# Patient Record
Sex: MALE | Race: WHITE | NOT HISPANIC OR LATINO | Employment: FULL TIME | URBAN - METROPOLITAN AREA
[De-identification: names, ages, dates, MRNs, and addresses within clinical notes are randomized per-mention and may not be internally consistent; named-entity substitution may affect disease eponyms.]

---

## 2024-08-19 ENCOUNTER — HOSPITAL ENCOUNTER (EMERGENCY)
Facility: HOSPITAL | Age: 19
Discharge: HOME | End: 2024-08-19
Attending: EMERGENCY MEDICINE
Payer: COMMERCIAL

## 2024-08-19 ENCOUNTER — APPOINTMENT (OUTPATIENT)
Dept: RADIOLOGY | Facility: HOSPITAL | Age: 19
End: 2024-08-19
Payer: COMMERCIAL

## 2024-08-19 VITALS
HEIGHT: 72 IN | SYSTOLIC BLOOD PRESSURE: 126 MMHG | DIASTOLIC BLOOD PRESSURE: 78 MMHG | TEMPERATURE: 99 F | WEIGHT: 165 LBS | BODY MASS INDEX: 22.35 KG/M2 | OXYGEN SATURATION: 97 % | RESPIRATION RATE: 16 BRPM | HEART RATE: 90 BPM

## 2024-08-19 DIAGNOSIS — R10.84 GENERALIZED ABDOMINAL PAIN: Primary | ICD-10-CM

## 2024-08-19 LAB
ALBUMIN SERPL-MCNC: 4.5 G/DL (ref 3.5–5)
ALP BLD-CCNC: 54 U/L (ref 35–125)
ALT SERPL-CCNC: 13 U/L (ref 5–40)
ANION GAP SERPL CALC-SCNC: 9 MMOL/L
APPEARANCE UR: CLEAR
AST SERPL-CCNC: 15 U/L (ref 5–40)
BASOPHILS # BLD AUTO: 0.02 X10*3/UL (ref 0–0.1)
BASOPHILS NFR BLD AUTO: 0.5 %
BILIRUB SERPL-MCNC: 0.8 MG/DL (ref 0.1–1.2)
BILIRUB UR STRIP.AUTO-MCNC: NEGATIVE MG/DL
BUN SERPL-MCNC: 20 MG/DL (ref 8–25)
CALCIUM SERPL-MCNC: 9.2 MG/DL (ref 8.5–10.4)
CHLORIDE SERPL-SCNC: 103 MMOL/L (ref 97–107)
CO2 SERPL-SCNC: 27 MMOL/L (ref 24–31)
COLOR UR: YELLOW
CREAT SERPL-MCNC: 1.2 MG/DL (ref 0.4–1.6)
EGFRCR SERPLBLD CKD-EPI 2021: 88 ML/MIN/1.73M*2
EOSINOPHIL # BLD AUTO: 0.05 X10*3/UL (ref 0–0.7)
EOSINOPHIL NFR BLD AUTO: 1.2 %
ERYTHROCYTE [DISTWIDTH] IN BLOOD BY AUTOMATED COUNT: 12.8 % (ref 11.5–14.5)
GLUCOSE SERPL-MCNC: 100 MG/DL (ref 65–99)
GLUCOSE UR STRIP.AUTO-MCNC: NORMAL MG/DL
HCT VFR BLD AUTO: 38.3 % (ref 41–52)
HGB BLD-MCNC: 12.8 G/DL (ref 13.5–17.5)
IMM GRANULOCYTES # BLD AUTO: 0 X10*3/UL (ref 0–0.7)
IMM GRANULOCYTES NFR BLD AUTO: 0 % (ref 0–0.9)
KETONES UR STRIP.AUTO-MCNC: ABNORMAL MG/DL
LEUKOCYTE ESTERASE UR QL STRIP.AUTO: NEGATIVE
LIPASE SERPL-CCNC: 28 U/L (ref 16–63)
LYMPHOCYTES # BLD AUTO: 1.88 X10*3/UL (ref 1.2–4.8)
LYMPHOCYTES NFR BLD AUTO: 43.6 %
MCH RBC QN AUTO: 28.8 PG (ref 26–34)
MCHC RBC AUTO-ENTMCNC: 33.4 G/DL (ref 32–36)
MCV RBC AUTO: 86 FL (ref 80–100)
MONOCYTES # BLD AUTO: 0.29 X10*3/UL (ref 0.1–1)
MONOCYTES NFR BLD AUTO: 6.7 %
MUCOUS THREADS #/AREA URNS AUTO: NORMAL /LPF
NEUTROPHILS # BLD AUTO: 2.07 X10*3/UL (ref 1.2–7.7)
NEUTROPHILS NFR BLD AUTO: 48 %
NITRITE UR QL STRIP.AUTO: NEGATIVE
NRBC BLD-RTO: 0 /100 WBCS (ref 0–0)
PH UR STRIP.AUTO: 6 [PH]
PLATELET # BLD AUTO: 193 X10*3/UL (ref 150–450)
POTASSIUM SERPL-SCNC: 3.8 MMOL/L (ref 3.4–5.1)
PROT SERPL-MCNC: 7 G/DL (ref 5.9–7.9)
PROT UR STRIP.AUTO-MCNC: ABNORMAL MG/DL
RBC # BLD AUTO: 4.44 X10*6/UL (ref 4.5–5.9)
RBC # UR STRIP.AUTO: NEGATIVE /UL
RBC #/AREA URNS AUTO: NORMAL /HPF
SODIUM SERPL-SCNC: 139 MMOL/L (ref 133–145)
SP GR UR STRIP.AUTO: 1.03
UROBILINOGEN UR STRIP.AUTO-MCNC: NORMAL MG/DL
WBC # BLD AUTO: 4.3 X10*3/UL (ref 4.4–11.3)
WBC #/AREA URNS AUTO: NORMAL /HPF

## 2024-08-19 PROCEDURE — 83690 ASSAY OF LIPASE: CPT | Performed by: PHYSICIAN ASSISTANT

## 2024-08-19 PROCEDURE — 80053 COMPREHEN METABOLIC PANEL: CPT | Performed by: PHYSICIAN ASSISTANT

## 2024-08-19 PROCEDURE — 81001 URINALYSIS AUTO W/SCOPE: CPT | Performed by: PHYSICIAN ASSISTANT

## 2024-08-19 PROCEDURE — 99284 EMERGENCY DEPT VISIT MOD MDM: CPT | Performed by: EMERGENCY MEDICINE

## 2024-08-19 PROCEDURE — 71046 X-RAY EXAM CHEST 2 VIEWS: CPT | Performed by: RADIOLOGY

## 2024-08-19 PROCEDURE — 36415 COLL VENOUS BLD VENIPUNCTURE: CPT | Performed by: PHYSICIAN ASSISTANT

## 2024-08-19 PROCEDURE — 85025 COMPLETE CBC W/AUTO DIFF WBC: CPT | Performed by: PHYSICIAN ASSISTANT

## 2024-08-19 PROCEDURE — 2500000004 HC RX 250 GENERAL PHARMACY W/ HCPCS (ALT 636 FOR OP/ED): Performed by: PHYSICIAN ASSISTANT

## 2024-08-19 PROCEDURE — 71046 X-RAY EXAM CHEST 2 VIEWS: CPT

## 2024-08-19 PROCEDURE — 96375 TX/PRO/DX INJ NEW DRUG ADDON: CPT | Performed by: EMERGENCY MEDICINE

## 2024-08-19 PROCEDURE — 96374 THER/PROPH/DIAG INJ IV PUSH: CPT | Performed by: EMERGENCY MEDICINE

## 2024-08-19 RX ORDER — KETOROLAC TROMETHAMINE 30 MG/ML
15 INJECTION, SOLUTION INTRAMUSCULAR; INTRAVENOUS ONCE
Status: COMPLETED | OUTPATIENT
Start: 2024-08-19 | End: 2024-08-19

## 2024-08-19 RX ORDER — IBUPROFEN 600 MG/1
600 TABLET ORAL EVERY 6 HOURS PRN
Qty: 20 TABLET | Refills: 0 | Status: SHIPPED | OUTPATIENT
Start: 2024-08-19

## 2024-08-19 RX ORDER — ONDANSETRON HYDROCHLORIDE 2 MG/ML
4 INJECTION, SOLUTION INTRAVENOUS ONCE
Status: COMPLETED | OUTPATIENT
Start: 2024-08-19 | End: 2024-08-19

## 2024-08-19 ASSESSMENT — PAIN - FUNCTIONAL ASSESSMENT
PAIN_FUNCTIONAL_ASSESSMENT: 0-10

## 2024-08-19 ASSESSMENT — PAIN SCALES - GENERAL
PAINLEVEL_OUTOF10: 7
PAINLEVEL_OUTOF10: 0 - NO PAIN
PAINLEVEL_OUTOF10: 0 - NO PAIN
PAINLEVEL_OUTOF10: 5 - MODERATE PAIN

## 2024-08-19 ASSESSMENT — COLUMBIA-SUICIDE SEVERITY RATING SCALE - C-SSRS
1. IN THE PAST MONTH, HAVE YOU WISHED YOU WERE DEAD OR WISHED YOU COULD GO TO SLEEP AND NOT WAKE UP?: NO
6. HAVE YOU EVER DONE ANYTHING, STARTED TO DO ANYTHING, OR PREPARED TO DO ANYTHING TO END YOUR LIFE?: NO
2. HAVE YOU ACTUALLY HAD ANY THOUGHTS OF KILLING YOURSELF?: NO

## 2024-08-19 NOTE — PROGRESS NOTES
Attestation/Supervisory note for TATIANNA George      The patient is a 19-year-old male presenting to the emergency department for evaluation of left-sided abdominal pain.  He states he has had pain for the past 5 days.  He states that it started gradually.  He states that it is primarily in the left lower quadrant.  He states that he started having some nausea and a few loose stools when his symptoms were started but those symptoms have since improved.  He has not been vomiting.  He states that the pain seems to be worse when he is moving around or trying to engage in sports training and flight training.  He states that when he is using the punching bag or when he is running or moving around it seems to be worse.  He states that the pain was pretty severe so he went to [facility] urgency department on 15 August 2024 to be evaluated for his pain.  He states he did have a CT that was normal.  He states that he did not feel his examination by the provider was adequate so he came to this hospital for a second opinion today.  He denies any previous abdominal surgeries.  He denies any recent injury or trauma.  No fever or chills.  No chest pain or shortness of breath.  No headache or visual changes.  No neck or back pain.  No urinary complaints.  All pertinent positives and negatives are recorded above.  All other systems reviewed and otherwise negative.  Vital signs within normal limits.  Physical exam with a well-nourished well-developed male in no acute distress.  HEENT exam within normal limits.  He has no evidence of airway compromise or respiratory distress.  Abdominal exam with mild tenderness to palpation of the left lower quadrant.  No rebound or guarding.  No palpable masses.  No flank pain with percussion or palpation.  He does not have any gross motor, neurologic or vascular deficits on exam.      IV Toradol, IV Zofran and IV fluids ordered      Diagnostic labs with mild leukopenia, mild anemia and trace  ketonuria but otherwise unremarkable.      CT abdomen/pelvis from [place] [facility] on 15 August 2024 done for the same sx  RESULT:   Evaluation is limited due to the lack of IV contrast.   Liver:  No mass.  Normal morphology.   Biliary:  No bile duct dilation.  Normal gallbladder   Spleen:  No mass.  No splenomegaly.   Pancreas:  No mass or duct dilation.   Adrenals:  No mass.   Kidneys:  No calculus or hydronephrosis.   GI tract:  No dilation or wall thickening.   Lymph nodes:  No abdominal or pelvic lymphadenopathy.   Mesentery/Peritoneum:  No free air or fluid collection.   Retroperitoneum:  No mass or hematoma.   Vasculature: No aortic aneurysm.   Pelvis:  No masses identified. Bladder is unremarkable.   Bones/Soft Tissues:  No acute fracture or destructive osseous lesion.   Lower thorax:  No acute pathology. 3 mm pulmonary nodular density left   lung base series 4 image 18       The patient does not have any significant lab abnormalities.  He does not have any evidence of dynamic instability.  He does not have a surgical abdomen by exam.  CT abdomen pelvis was performed 4 days ago at another facility for the same symptoms and did not show any significant abnormality.  We did discuss options with the patient.  He is agreeable to outpatient follow-up with his primary care physician given that he has not had any worsening of symptoms since his previous CT imaging and does not have any significant lab abnormalities.      The patient was released in good condition.  Will follow-up with his primary care physician within 1 to 2 days for further management of his current symptoms.  He was also given a referral to gastroenterology.  He will return to the emergency department sooner with worsening of symptoms or onset of new symptoms.      Impression/diagnosis:  1.  Abdominal pain, left lower quadrant  2.  Anemia, unspecified      I personally saw the patient and made/approve the management plan and take responsibility for  the patient management.      I independently interpreted the following study (S) diagnostic labs      I personally discussed the patient's management with the patient      I reviewed the results of the diagnostic labs and diagnostic imaging.  Formal radiology read was completed by the radiologist.      Ashley Carpio MD

## 2024-08-20 NOTE — ED PROVIDER NOTES
HPI   Chief Complaint   Patient presents with    Abdominal Pain     Pt presents to the ED with c/c of abd pain. Pt states this has been worsening since the 14th. Per pt he is sob, can not lift things and its a pressure.        19-year-old male presenting emergency department with a chief complaint of pain and lower abdominal pain.  States has been for the last 5 days.  He was seen at outside hospital 4 days ago.  Had CT imaging which was negative.  Chart review also indicates he had a CT scan in May for similar complaint which was also negative.  He states pain is worse with movement and when he sits in the car for long periods of time.  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  He was able to run a mile just earlier in the week.  He applied for abdominal surgery.  Well-appearing nontoxic afebrile.  No other complaint.              Patient History   Past Medical History:   Diagnosis Date    Allergic contact dermatitis due to plants, except food 06/10/2013    Contact dermatitis due to poison ivy    Body mass index (BMI) pediatric, greater than or equal to 95th percentile for age 06/04/2020    Body mass index (BMI) 95th percentile or greater with athletic build, pediatric    Personal history of other diseases of the musculoskeletal system and connective tissue 07/02/2016    History of low back pain    Personal history of other infectious and parasitic diseases 08/21/2019    History of infectious mononucleosis    Personal history of other specified conditions 08/20/2019    History of fever    Personal history of other specified conditions 08/20/2019    History of fatigue    Sprain of unspecified ligament of left ankle, initial encounter 07/01/2020    Left ankle sprain    Unspecified injury of unspecified elbow, initial encounter 10/12/2014    Elbow injury    Unspecified injury of unspecified foot, initial encounter 05/12/2014    Foot injury     History reviewed. No  pertinent surgical history.  No family history on file.  Social History     Tobacco Use    Smoking status: Unknown    Smokeless tobacco: Not on file   Substance Use Topics    Alcohol use: Not on file    Drug use: Not on file       Physical Exam   ED Triage Vitals [08/19/24 1927]   Temperature Heart Rate Respirations BP   37.2 °C (99 °F) 90 16 126/78      Pulse Ox Temp Source Heart Rate Source Patient Position   97 % Temporal Monitor Sitting      BP Location FiO2 (%)     Left arm --       Physical Exam  Vitals and nursing note reviewed.   Constitutional:       Appearance: He is well-developed.   HENT:      Head: Normocephalic.   Cardiovascular:      Rate and Rhythm: Normal rate and regular rhythm.   Pulmonary:      Effort: Pulmonary effort is normal.   Abdominal:      General: Abdomen is flat.      Palpations: Abdomen is soft.   Neurological:      General: No focal deficit present.      Mental Status: He is alert and oriented to person, place, and time.   Psychiatric:         Mood and Affect: Mood normal.           ED Course & MDM   Diagnoses as of 08/19/24 2037   Generalized abdominal pain                 No data recorded     Jim Coma Scale Score: 15 (08/19/24 1933 : Kristofer Pope RN)                           Medical Decision Making  I have seen and evaluated this patient.  The attending physician has also seen and evaluated this patient.  Vital signs, laboratory testing and diagnostic images if applicable have been reviewed.  All laboratory and imaging is interpreted by myself unless otherwise stated.  Radiology studies are also formally interpreted by radiologist.    CBC without significant leukocytosis or anemia, metabolic panel without significant renal impairment or electrolyte abnormality.  Patient with benign abdominal exam.  This is likely musculoskeletal in etiology.  There is no indication to repeat a CT scan on this patient that has had 2 negative CT scans already in 2024 as suspicion for  acute emergent intra-abdominal pathology is exceedingly low.  Patient will be given outpatient gastroenterology follow-up.  Released in good condition from emergent standpoint.      Labs Reviewed   CBC WITH AUTO DIFFERENTIAL - Abnormal       Result Value    WBC 4.3 (*)     nRBC 0.0      RBC 4.44 (*)     Hemoglobin 12.8 (*)     Hematocrit 38.3 (*)     MCV 86      MCH 28.8      MCHC 33.4      RDW 12.8      Platelets 193      Neutrophils % 48.0      Immature Granulocytes %, Automated 0.0      Lymphocytes % 43.6      Monocytes % 6.7      Eosinophils % 1.2      Basophils % 0.5      Neutrophils Absolute 2.07      Immature Granulocytes Absolute, Automated 0.00      Lymphocytes Absolute 1.88      Monocytes Absolute 0.29      Eosinophils Absolute 0.05      Basophils Absolute 0.02     COMPREHENSIVE METABOLIC PANEL - Abnormal    Glucose 100 (*)     Sodium 139      Potassium 3.8      Chloride 103      Bicarbonate 27      Urea Nitrogen 20      Creatinine 1.20      eGFR 88      Calcium 9.2      Albumin 4.5      Alkaline Phosphatase 54      Total Protein 7.0      AST 15      Bilirubin, Total 0.8      ALT 13      Anion Gap 9     URINALYSIS WITH REFLEX CULTURE AND MICROSCOPIC - Abnormal    Color, Urine Yellow      Appearance, Urine Clear      Specific Gravity, Urine 1.035      pH, Urine 6.0      Protein, Urine 20 (TRACE)      Glucose, Urine Normal      Blood, Urine NEGATIVE      Ketones, Urine TRACE (*)     Bilirubin, Urine NEGATIVE      Urobilinogen, Urine Normal      Nitrite, Urine NEGATIVE      Leukocyte Esterase, Urine NEGATIVE     LIPASE - Normal    Lipase 28     URINALYSIS WITH REFLEX CULTURE AND MICROSCOPIC    Narrative:     The following orders were created for panel order Urinalysis with Reflex Culture and Microscopic.  Procedure                               Abnormality         Status                     ---------                               -----------         ------                     Urinalysis with Reflex  C...[[id number]]  Abnormal            Final result               Extra Urine Gray Tube[[id number]]                                                         Please view results for these tests on the individual orders.   EXTRA URINE GRAY TUBE   URINALYSIS MICROSCOPIC WITH REFLEX CULTURE    WBC, Urine 1-5      RBC, Urine NONE      Mucus, Urine FEW       XR chest 2 views   Final Result   No acute abnormalities        Signed by: Sammi Vazquez 8/19/2024 8:25 PM   Dictation workstation:   [id number]        Medications   ketorolac (Toradol) injection 15 mg (15 mg intravenous Given 8/19/24 1952)   ondansetron (Zofran) injection 4 mg (4 mg intravenous Given 8/19/24 1952)   sodium chloride 0.9 % bolus 1,000 mL (1,000 mL intravenous New Bag 8/19/24 1951)     New Prescriptions    IBUPROFEN 600 MG TABLET    Take 1 tablet (600 mg) by mouth every 6 hours if needed for mild pain (1 - 3) for up to 20 doses.         Procedure  Procedures     Alfredo George PA-C  08/19/24 2037       Alfredo George PA-C  08/21/24 1920

## 2024-09-06 ENCOUNTER — LAB (OUTPATIENT)
Dept: LAB | Facility: LAB | Age: 19
End: 2024-09-06
Payer: COMMERCIAL

## 2024-09-06 DIAGNOSIS — D64.9 ANEMIA, UNSPECIFIED: Primary | ICD-10-CM

## 2024-09-06 LAB
FERRITIN SERPL-MCNC: 80 NG/ML (ref 20–300)
IRON SATN MFR SERPL: 23 % (ref 25–45)
IRON SERPL-MCNC: 92 UG/DL (ref 35–150)
TIBC SERPL-MCNC: 398 UG/DL (ref 240–445)
TSH SERPL-ACNC: 0.88 MIU/L (ref 0.44–3.98)
UIBC SERPL-MCNC: 306 UG/DL (ref 110–370)

## 2024-09-06 PROCEDURE — 82746 ASSAY OF FOLIC ACID SERUM: CPT

## 2024-09-06 PROCEDURE — 82607 VITAMIN B-12: CPT

## 2024-09-07 LAB
FOLATE SERPL-MCNC: 20.1 NG/ML
VIT B12 SERPL-MCNC: 463 PG/ML (ref 211–911)

## 2024-09-27 ENCOUNTER — APPOINTMENT (OUTPATIENT)
Dept: RADIOLOGY | Facility: CLINIC | Age: 19
End: 2024-09-27
Payer: COMMERCIAL

## 2024-09-30 ENCOUNTER — HOSPITAL ENCOUNTER (OUTPATIENT)
Dept: RADIOLOGY | Facility: CLINIC | Age: 19
End: 2024-09-30
Payer: COMMERCIAL

## 2024-10-02 ENCOUNTER — HOSPITAL ENCOUNTER (OUTPATIENT)
Dept: RADIOLOGY | Facility: CLINIC | Age: 19
Discharge: HOME | End: 2024-10-02
Payer: COMMERCIAL

## 2024-10-02 DIAGNOSIS — R10.9 UNSPECIFIED ABDOMINAL PAIN: ICD-10-CM

## 2024-10-02 PROCEDURE — 76705 ECHO EXAM OF ABDOMEN: CPT

## 2024-10-02 PROCEDURE — 76705 ECHO EXAM OF ABDOMEN: CPT | Performed by: RADIOLOGY
